# Patient Record
Sex: FEMALE | Race: WHITE | Employment: FULL TIME | URBAN - METROPOLITAN AREA
[De-identification: names, ages, dates, MRNs, and addresses within clinical notes are randomized per-mention and may not be internally consistent; named-entity substitution may affect disease eponyms.]

---

## 2022-03-18 PROBLEM — M54.9 BACK PAIN: Status: ACTIVE | Noted: 2018-06-27

## 2022-03-18 PROBLEM — G40.909 SEIZURE DISORDER (HCC): Status: ACTIVE | Noted: 2018-06-27

## 2022-03-18 PROBLEM — F41.8 MIXED ANXIETY DEPRESSIVE DISORDER: Status: ACTIVE | Noted: 2018-06-27

## 2022-03-19 PROBLEM — F41.9 ANXIETY: Status: ACTIVE | Noted: 2018-06-27

## 2022-03-20 PROBLEM — Z88.9 H/O SEASONAL ALLERGIES: Status: ACTIVE | Noted: 2018-06-27

## 2022-08-01 ENCOUNTER — OFFICE VISIT (OUTPATIENT)
Dept: OCCUPATIONAL MEDICINE | Age: 32
End: 2022-08-01

## 2022-08-01 VITALS — TEMPERATURE: 98.5 F | OXYGEN SATURATION: 96 % | HEART RATE: 78 BPM

## 2022-08-01 DIAGNOSIS — Z11.52 ENCOUNTER FOR SCREENING FOR COVID-19: Primary | ICD-10-CM

## 2022-08-01 LAB
EXP DATE SOLUTION: NORMAL
EXP DATE SWAB: NORMAL
LOT NUMBER SOLUTION: NORMAL
LOT NUMBER SWAB: NORMAL
SARS-COV-2 RNA, POC: NEGATIVE

## 2022-08-01 PROCEDURE — 87635 SARS-COV-2 COVID-19 AMP PRB: CPT

## 2022-08-01 PROCEDURE — 99213 OFFICE O/P EST LOW 20 MIN: CPT

## 2022-08-01 RX ORDER — PROPRANOLOL HYDROCHLORIDE 80 MG/1
80 CAPSULE, EXTENDED RELEASE ORAL DAILY
COMMUNITY

## 2022-08-01 RX ORDER — ESOMEPRAZOLE MAGNESIUM 20 MG/1
20 FOR SUSPENSION ORAL DAILY
COMMUNITY

## 2022-08-01 RX ORDER — TOPIRAMATE 50 MG/1
50 TABLET, FILM COATED ORAL 2 TIMES DAILY
COMMUNITY

## 2022-08-01 NOTE — PROGRESS NOTES
PROGRESS NOTE    SUBJECTIVE:   Elisha England is a 29 y.o. female seen for covid exposure. She currently denies any other complaints or concerns at this time. Current Outpatient Medications   Medication Sig Dispense Refill    topiramate (TOPAMAX) 50 MG tablet Take 50 mg by mouth in the morning and 50 mg before bedtime. propranolol (INDERAL LA) 80 MG extended release capsule Take 80 mg by mouth in the morning. esomeprazole Magnesium (NEXIUM) 20 MG PACK Take 20 mg by mouth in the morning. cyclobenzaprine (FLEXERIL) 10 MG tablet Take 10 mg by mouth 3 times daily as needed      naproxen (NAPROSYN) 500 MG tablet Take 500 mg by mouth 2 times daily (with meals)       No current facility-administered medications for this visit. No Known Allergies  Social History     Tobacco Use    Smoking status: Every Day     Packs/day: 0.50     Types: Cigarettes    Smokeless tobacco: Never   Substance Use Topics    Alcohol use: No              OBJECTIVE:  Pulse 78   Temp 98.5 °F (36.9 °C)   SpO2 96%      Physical Exam  Constitutional:       General: She is not in acute distress. Appearance: Normal appearance. She is well-developed and well-groomed. She is not ill-appearing. HENT:      Head: Normocephalic. Mouth/Throat:      Mouth: Mucous membranes are moist.      Pharynx: Oropharynx is clear. Cardiovascular:      Rate and Rhythm: Normal rate and regular rhythm. Pulses: Normal pulses. Heart sounds: Normal heart sounds, S1 normal and S2 normal. Heart sounds not distant. No murmur heard. No friction rub. No gallop. Pulmonary:      Effort: Pulmonary effort is normal.      Breath sounds: Normal breath sounds. Skin:     General: Skin is warm and dry. Neurological:      General: No focal deficit present. Mental Status: She is alert and oriented to person, place, and time.    Psychiatric:         Mood and Affect: Mood normal.         Behavior: Behavior normal. Behavior is cooperative. Results for orders placed or performed in visit on 08/01/22   AMB POC COVID-19 COV   Result Value Ref Range    SARS-COV-2 RNA, POC Negative     Lot number swab      EXP date swab      Lot number solution      EXP date solution        ASSESSMENT and PLAN    Diagnoses and all orders for this visit:    Encounter for screening for COVID-19  -     AMB POC COVID-19 COV  Please return to clinic as needed. COVID-19 and the flu have these symptoms in common:  Fever or chills  Cough  Shortness of breath  Fatigue (tiredness)  Sore throat  Runny or stuffy nose  Muscle and body aches  Headache  Vomiting and diarrhea (more common in children than adults)  COVID-19 has another symptom that also may occur:  New loss of taste or smell  COVID-19 symptoms may appear from 2 to 14 days after infection. Flu symptoms usually appear 1 to 4 days after infection. Counseled on benefits of having a primary care provider which includes, but is not limited to, continuity of care and having a medical home when concerns arise. Also enforced that onsite clinic policy states that we are not to take the place of a primary care provider, pt verbalized understanding. SEs and risk vs benefits associated with medications prescribed discussed with patient who verbalized understanding. Pt verbalized understanding and agreement with plan of care. RTC for persisting/worsening symptoms or new complaints that arise. Discussed signs and symptoms that would warrant immediate evaluation including, but not limited to HA, blurred vision, speech disturbance, difficulty with ambulation/gait, numbness, tingling, weakness, syncope, chest pain, or shortness of breath. I have reviewed the patient's medication list, past medical, family, social, and surgical history in detail and updated the patient record appropriately. No follow-up provider specified.     Shimon Renee NP

## 2022-09-26 ENCOUNTER — OFFICE VISIT (OUTPATIENT)
Dept: OCCUPATIONAL MEDICINE | Age: 32
End: 2022-09-26

## 2022-09-26 DIAGNOSIS — M54.40 ACUTE RIGHT-SIDED LOW BACK PAIN WITH SCIATICA, SCIATICA LATERALITY UNSPECIFIED: Primary | ICD-10-CM

## 2022-09-26 RX ORDER — BACLOFEN 10 MG/1
10 TABLET ORAL 3 TIMES DAILY
COMMUNITY
Start: 2022-09-20 | End: 2022-12-19

## 2022-09-26 RX ORDER — DULOXETIN HYDROCHLORIDE 20 MG/1
20 CAPSULE, DELAYED RELEASE ORAL DAILY
COMMUNITY
Start: 2022-09-20 | End: 2022-10-20

## 2022-09-26 ASSESSMENT — ENCOUNTER SYMPTOMS
BOWEL INCONTINENCE: 0
ABDOMINAL PAIN: 0
BACK PAIN: 1

## 2022-09-26 NOTE — PROGRESS NOTES
PROGRESS NOTE    SUBJECTIVE:   Mason Gaines is a 29 y.o. female seen for numbness and difficulty walking with right leg. States she was out last week from work and went to her PCP. She was diagnosed with muscle spasms of lower back. States she did not have a xray but one is scheduled for mid October. She now states that the pain \"shoots down her right leg and that leg feels numb and cold at times\". Patient unable to drive, she has been brought to this clinic by her grandfather. Patients ability for mobilization is limited as evidenced by patients gate and inability to walk without assistance. Denies known injury to area but states she has experienced some pain and discomfort for some time and it has progressively worsened. Back Pain  This is a recurrent problem. The current episode started 1 to 4 weeks ago. The problem occurs daily. The problem has been gradually worsening since onset. The pain is present in the gluteal and sacro-iliac. The quality of the pain is described as shooting and aching. The pain radiates to the right foot. The pain is at a severity of 8/10. The pain is severe. The pain is Worse during the day. The symptoms are aggravated by standing and position. Associated symptoms include leg pain, numbness, tingling and weakness. Pertinent negatives include no abdominal pain, bladder incontinence, bowel incontinence, chest pain, dysuria, fever, headaches, paresis, paresthesias, pelvic pain or perianal numbness. Risk factors include obesity. She has tried bed rest, home exercises, muscle relaxant, NSAIDs and analgesics for the symptoms. The treatment provided mild relief. Current Outpatient Medications   Medication Sig Dispense Refill    baclofen (LIORESAL) 10 MG tablet Take 10 mg by mouth 3 times daily      DULoxetine (CYMBALTA) 20 MG extended release capsule Take 20 mg by mouth daily      topiramate (TOPAMAX) 50 MG tablet Take 50 mg by mouth in the morning and 50 mg before bedtime. propranolol (INDERAL LA) 80 MG extended release capsule Take 80 mg by mouth in the morning. esomeprazole Magnesium (NEXIUM) 20 MG PACK Take 20 mg by mouth in the morning. cyclobenzaprine (FLEXERIL) 10 MG tablet Take 10 mg by mouth 3 times daily as needed      naproxen (NAPROSYN) 500 MG tablet Take 500 mg by mouth 2 times daily (with meals)       No current facility-administered medications for this visit. No Known Allergies  Social History     Tobacco Use    Smoking status: Every Day     Packs/day: 0.50     Types: Cigarettes    Smokeless tobacco: Never   Substance Use Topics    Alcohol use: No       Review of Systems   Constitutional:  Negative for fever. Cardiovascular:  Negative for chest pain. Gastrointestinal:  Negative for abdominal pain and bowel incontinence. Genitourinary:  Negative for bladder incontinence, decreased urine volume, difficulty urinating, dyspareunia, dysuria, flank pain, pelvic pain and urgency. Musculoskeletal:  Positive for back pain and gait problem. Neurological:  Positive for tingling, weakness and numbness. Negative for headaches and paresthesias. OBJECTIVE:  Unable to conduct due to loss of power in office. Physical Exam  Constitutional:       Appearance: Normal appearance. She is well-developed and well-groomed. HENT:      Head: Normocephalic. Cardiovascular:      Pulses:           Dorsalis pedis pulses are 2+ on the right side. Posterior tibial pulses are 2+ on the right side. Musculoskeletal:         General: No swelling, tenderness or deformity. Right knee: Normal.      Right lower leg: Normal. No swelling, deformity or tenderness. No edema. Left lower leg: No edema. Right ankle: Normal.      Right foot: Normal.   Skin:     General: Skin is warm and dry. Comments: Right leg warm to touch, no mottling noted. Pulses 2+. Neurological:      Mental Status: She is alert and oriented to person, place, and time. Psychiatric:         Mood and Affect: Mood normal.         Behavior: Behavior normal. Behavior is cooperative. Diagnosis Date    Anxiety 6/27/2018    Depression 6/27/2018    H/O seasonal allergies 6/27/2018    Low back pain 6/27/2018    Seizure ([facility] [address] 6/27/2018    15 yrs since last one      Past Surgical History:   Procedure Laterality Date    TONSILLECTOMY          ASSESSMENT and PLAN        Diagnoses and all orders for this visit:    Acute right-sided low back pain with sciatica, sciatica laterality unspecified    Advised patient to be seen in ER due to pain intensity and decreased range of motion with numbness that radiates. Explained limitations at this clinic and advised that she go to nearest ER. Patient assisted by wheelchair to [place] vehicle. Advised patient not to drive. She verbally agreed. Patient to discuss Short term disability with HR. Questions encouraged and answered. Counseled on benefits of having a primary care provider which includes, but is not limited to, continuity of care and having a medical home when concerns arise. Also enforced that onsite clinic policy states that we are not to take the place of a primary care provider, pt verbalized understanding. SEs and risk vs benefits associated with medications prescribed discussed with patient who verbalized understanding. Pt verbalized understanding and agreement with plan of care. RTC for persisting/worsening symptoms or new complaints that arise. Discussed signs and symptoms that would warrant immediate evaluation including, but not limited to HA, blurred vision, speech disturbance, difficulty with ambulation/gait, numbness, tingling, weakness, syncope, chest pain, or shortness of breath. I have reviewed the patient's medication list, past medical, family, social, and surgical history in detail and updated the patient record appropriately. No follow-up provider specified.     Siva Flood

## 2023-06-19 ENCOUNTER — OFFICE VISIT (OUTPATIENT)
Dept: OCCUPATIONAL MEDICINE | Age: 33
End: 2023-06-19

## 2023-06-19 DIAGNOSIS — F31.62 BIPOLAR MIXED AFFECTIVE DISORDER, MODERATE (HCC): Primary | ICD-10-CM

## 2023-06-19 RX ORDER — DICLOFENAC SODIUM 75 MG/1
75 TABLET, DELAYED RELEASE ORAL
COMMUNITY
Start: 2017-10-05

## 2023-06-19 RX ORDER — DULOXETIN HYDROCHLORIDE 60 MG/1
CAPSULE, DELAYED RELEASE ORAL
COMMUNITY
Start: 2023-05-24

## 2023-06-19 ASSESSMENT — PATIENT HEALTH QUESTIONNAIRE - PHQ9
6. FEELING BAD ABOUT YOURSELF - OR THAT YOU ARE A FAILURE OR HAVE LET YOURSELF OR YOUR FAMILY DOWN: 3
SUM OF ALL RESPONSES TO PHQ QUESTIONS 1-9: 22
3. TROUBLE FALLING OR STAYING ASLEEP: 3
SUM OF ALL RESPONSES TO PHQ QUESTIONS 1-9: 22
SUM OF ALL RESPONSES TO PHQ QUESTIONS 1-9: 19
10. IF YOU CHECKED OFF ANY PROBLEMS, HOW DIFFICULT HAVE THESE PROBLEMS MADE IT FOR YOU TO DO YOUR WORK, TAKE CARE OF THINGS AT HOME, OR GET ALONG WITH OTHER PEOPLE: 0
SUM OF ALL RESPONSES TO PHQ QUESTIONS 1-9: 22
5. POOR APPETITE OR OVEREATING: 1
SUM OF ALL RESPONSES TO PHQ9 QUESTIONS 1 & 2: 6
7. TROUBLE CONCENTRATING ON THINGS, SUCH AS READING THE NEWSPAPER OR WATCHING TELEVISION: 2
8. MOVING OR SPEAKING SO SLOWLY THAT OTHER PEOPLE COULD HAVE NOTICED. OR THE OPPOSITE, BEING SO FIGETY OR RESTLESS THAT YOU HAVE BEEN MOVING AROUND A LOT MORE THAN USUAL: 1
9. THOUGHTS THAT YOU WOULD BE BETTER OFF DEAD, OR OF HURTING YOURSELF: 3
2. FEELING DOWN, DEPRESSED OR HOPELESS: 3
4. FEELING TIRED OR HAVING LITTLE ENERGY: 3
1. LITTLE INTEREST OR PLEASURE IN DOING THINGS: 3

## 2023-06-19 ASSESSMENT — ANXIETY QUESTIONNAIRES
GAD7 TOTAL SCORE: 19
7. FEELING AFRAID AS IF SOMETHING AWFUL MIGHT HAPPEN: 3
IF YOU CHECKED OFF ANY PROBLEMS ON THIS QUESTIONNAIRE, HOW DIFFICULT HAVE THESE PROBLEMS MADE IT FOR YOU TO DO YOUR WORK, TAKE CARE OF THINGS AT HOME, OR GET ALONG WITH OTHER PEOPLE: SOMEWHAT DIFFICULT
5. BEING SO RESTLESS THAT IT IS HARD TO SIT STILL: 3
3. WORRYING TOO MUCH ABOUT DIFFERENT THINGS: 2
1. FEELING NERVOUS, ANXIOUS, OR ON EDGE: 3
2. NOT BEING ABLE TO STOP OR CONTROL WORRYING: 3
6. BECOMING EASILY ANNOYED OR IRRITABLE: 3
4. TROUBLE RELAXING: 2

## 2023-06-19 ASSESSMENT — ENCOUNTER SYMPTOMS
RESPIRATORY NEGATIVE: 1
SHORTNESS OF BREATH: 0

## 2023-06-19 ASSESSMENT — COLUMBIA-SUICIDE SEVERITY RATING SCALE - C-SSRS
5. HAVE YOU STARTED TO WORK OUT OR WORKED OUT THE DETAILS OF HOW TO KILL YOURSELF? DO YOU INTEND TO CARRY OUT THIS PLAN?: NO
1. WITHIN THE PAST MONTH, HAVE YOU WISHED YOU WERE DEAD OR WISHED YOU COULD GO TO SLEEP AND NOT WAKE UP?: YES
4. HAVE YOU HAD THESE THOUGHTS AND HAD SOME INTENTION OF ACTING ON THEM?: NO
2. HAVE YOU ACTUALLY HAD ANY THOUGHTS OF KILLING YOURSELF?: YES
3. HAVE YOU BEEN THINKING ABOUT HOW YOU MIGHT KILL YOURSELF?: NO
6. HAVE YOU EVER DONE ANYTHING, STARTED TO DO ANYTHING, OR PREPARED TO DO ANYTHING TO END YOUR LIFE?: NO

## 2023-06-19 NOTE — PROGRESS NOTES
PROGRESS NOTE    SUBJECTIVE:   Jessi Jackson is a 34 y.o. female seen for bipolar mixed and possible schizophrenia. Patient states that she currently is taking valproic acid and Cymbalta and \"feels limited emotions\". She was recently told by PCP that she needed to find psychiatrist to manage her medications since they have tried and are unsuccessful. PCP has placed a referral with [facility] and patient is not sure if she wants to use [facility] for psychiatric needs. She states that she \"hears negative voices\" daily that make her question her self worth. She is also having visual hallucinations that include seeing \"shadow people and rats\". States that she feels as if she \"wears a mask around others\" so they will not know that she is dealing with internal struggles. Patient states that she has dealt with anxiety and depression and has seen psychiatry/counseling as a child and in younger adult years but she stopped going. Denies SI/HI and states that she does know the number to the suicide hotline and would call if she ever thought she might go through with trying to end her life. States that she knows that her mother and grandmother both suffered from depression and possible being bipolar but they will not get help. Patient is close with grandfather and states she would not end her life because she would miss her family especially her grandfather and her pet dog. Patient would like help finding someone to talk with and have outpatient therapy. Does not want to be admitted for inpatient therapy. Depression  Visit Type: initial  Onset of symptoms: more than 5 years ago  Progression since onset: waxing and waning  Patient presents with the following symptoms: decreased concentration, depressed mood, excessive worry, fatigue, feelings of hopelessness, feelings of worthlessness, nervousness/anxiety, restlessness and weight gain.   Patient is not experiencing: compulsions,

## 2023-06-21 ENCOUNTER — CLINICAL DOCUMENTATION (OUTPATIENT)
Dept: OCCUPATIONAL MEDICINE | Age: 33
End: 2023-06-21

## 2023-06-21 DIAGNOSIS — F32.A ANXIETY AND DEPRESSION: Primary | ICD-10-CM

## 2023-06-21 DIAGNOSIS — F41.9 ANXIETY AND DEPRESSION: Primary | ICD-10-CM

## 2023-06-21 NOTE — PROGRESS NOTES
PROGRESS NOTE    SUBJECTIVE:   Desire Madrigal is a 34 y.o. female seen for update of referral information that was requested by patient. Current Outpatient Medications   Medication Sig Dispense Refill    diclofenac (VOLTAREN) 75 MG EC tablet Take 1 tablet by mouth      DULoxetine (CYMBALTA) 60 MG extended release capsule       DULoxetine (CYMBALTA) 20 MG extended release capsule Take 20 mg by mouth daily      topiramate (TOPAMAX) 50 MG tablet Take 1 tablet by mouth 2 times daily      propranolol (INDERAL LA) 80 MG extended release capsule Take 1 capsule by mouth daily      esomeprazole Magnesium (NEXIUM) 20 MG PACK Take 1 packet by mouth daily      cyclobenzaprine (FLEXERIL) 10 MG tablet Take 10 mg by mouth 3 times daily as needed (Patient not taking: Reported on 6/19/2023)      naproxen (NAPROSYN) 500 MG tablet Take 500 mg by mouth 2 times daily (with meals) (Patient not taking: Reported on 6/19/2023)       No current facility-administered medications for this visit. No Known Allergies  Social History     Tobacco Use    Smoking status: Former     Packs/day: 0.50     Types: Cigarettes    Smokeless tobacco: Never   Substance Use Topics    Alcohol use: No              OBJECTIVE:  There were no vitals taken for this visit. ASSESSMENT and Marquis Myers was seen today for other. Diagnoses and all orders for this visit:    Anxiety and depression    Patient is encouraged to come into clinic any time she needs to discuss any anxiety/ depression or health issues she may be experiencing. Informed patient that her PCP has already placed a referral for behavioral health and for psychiatry. Information also given to patient regarding [facility] offices and outpatient/inpatient for [facility]. Patient verbalized understanding and is to call PCP regarding the referral since it has been over a week and she has not heard anything yet.       Counseled on benefits of having a